# Patient Record
Sex: MALE | Race: WHITE
[De-identification: names, ages, dates, MRNs, and addresses within clinical notes are randomized per-mention and may not be internally consistent; named-entity substitution may affect disease eponyms.]

---

## 2022-07-01 LAB
AVERAGE GLUCOSE: NORMAL
HBA1C MFR BLD: 6.4 %

## 2022-09-30 ENCOUNTER — OFFICE VISIT (OUTPATIENT)
Dept: FAMILY MEDICINE CLINIC | Facility: CLINIC | Age: 72
End: 2022-09-30
Payer: MEDICARE

## 2022-09-30 VITALS
DIASTOLIC BLOOD PRESSURE: 84 MMHG | HEART RATE: 77 BPM | WEIGHT: 221 LBS | BODY MASS INDEX: 29.93 KG/M2 | HEIGHT: 72 IN | SYSTOLIC BLOOD PRESSURE: 118 MMHG | OXYGEN SATURATION: 98 %

## 2022-09-30 DIAGNOSIS — I50.32 CHRONIC DIASTOLIC CONGESTIVE HEART FAILURE (HCC): ICD-10-CM

## 2022-09-30 DIAGNOSIS — Z12.11 SCREEN FOR COLON CANCER: ICD-10-CM

## 2022-09-30 DIAGNOSIS — E78.00 HYPERCHOLESTEREMIA: ICD-10-CM

## 2022-09-30 DIAGNOSIS — N18.4 CKD (CHRONIC KIDNEY DISEASE) STAGE 4, GFR 15-29 ML/MIN (HCC): Primary | ICD-10-CM

## 2022-09-30 DIAGNOSIS — I15.1 HYPERTENSION SECONDARY TO OTHER RENAL DISORDERS: ICD-10-CM

## 2022-09-30 DIAGNOSIS — N28.89 HYPERTENSION SECONDARY TO OTHER RENAL DISORDERS: ICD-10-CM

## 2022-09-30 PROBLEM — R53.83 FATIGUE: Status: ACTIVE | Noted: 2022-01-26

## 2022-09-30 PROBLEM — E11.9 CONTROLLED TYPE 2 DIABETES MELLITUS, WITHOUT LONG-TERM CURRENT USE OF INSULIN (HCC): Status: ACTIVE | Noted: 2017-01-26

## 2022-09-30 PROBLEM — E66.01 CLASS 2 SEVERE OBESITY DUE TO EXCESS CALORIES WITH SERIOUS COMORBIDITY AND BODY MASS INDEX (BMI) OF 37.0 TO 37.9 IN ADULT (HCC): Status: ACTIVE | Noted: 2017-08-29

## 2022-09-30 PROBLEM — N17.9 AKI (ACUTE KIDNEY INJURY) (HCC): Status: ACTIVE | Noted: 2022-02-02

## 2022-09-30 PROBLEM — M54.50 LOWER BACK PAIN: Status: ACTIVE | Noted: 2019-09-12

## 2022-09-30 PROBLEM — J81.0 ACUTE PULMONARY EDEMA (HCC): Status: ACTIVE | Noted: 2022-01-27

## 2022-09-30 PROBLEM — R60.9 PERIPHERAL EDEMA: Status: ACTIVE | Noted: 2022-01-21

## 2022-09-30 PROBLEM — I25.10 CORONARY ARTERY DISEASE INVOLVING NATIVE CORONARY ARTERY OF NATIVE HEART WITHOUT ANGINA PECTORIS: Status: ACTIVE | Noted: 2018-11-02

## 2022-09-30 PROBLEM — E87.5 HYPERKALEMIA: Status: ACTIVE | Noted: 2022-08-09

## 2022-09-30 PROBLEM — D69.6 THROMBOCYTOPENIA (HCC): Status: ACTIVE | Noted: 2022-02-03

## 2022-09-30 PROBLEM — R60.1 GENERALIZED EDEMA: Status: ACTIVE | Noted: 2019-09-12

## 2022-09-30 PROBLEM — Z00.00 MEDICARE ANNUAL WELLNESS VISIT, SUBSEQUENT: Status: ACTIVE | Noted: 2021-04-19

## 2022-09-30 PROCEDURE — 99203 OFFICE O/P NEW LOW 30 MIN: CPT | Performed by: FAMILY MEDICINE

## 2022-09-30 PROCEDURE — 1123F ACP DISCUSS/DSCN MKR DOCD: CPT | Performed by: FAMILY MEDICINE

## 2022-09-30 RX ORDER — ACETAMINOPHEN 500 MG
1 TABLET ORAL DAILY PRN
COMMUNITY

## 2022-09-30 RX ORDER — SPIRONOLACTONE 25 MG/1
25 TABLET ORAL DAILY
COMMUNITY
Start: 2022-04-19 | End: 2023-04-19

## 2022-09-30 RX ORDER — ROSUVASTATIN CALCIUM 40 MG/1
40 TABLET, COATED ORAL DAILY
Qty: 90 TABLET | Refills: 1 | Status: SHIPPED | OUTPATIENT
Start: 2022-09-30 | End: 2023-03-29

## 2022-09-30 RX ORDER — TORSEMIDE 100 MG/1
150 TABLET ORAL DAILY
COMMUNITY
Start: 2022-02-23

## 2022-09-30 RX ORDER — ROSUVASTATIN CALCIUM 40 MG/1
40 TABLET, COATED ORAL DAILY
COMMUNITY
Start: 2022-02-23 | End: 2022-09-30 | Stop reason: SDUPTHER

## 2022-09-30 RX ORDER — BLOOD SUGAR DIAGNOSTIC
STRIP MISCELLANEOUS
COMMUNITY
Start: 2022-09-06

## 2022-09-30 SDOH — ECONOMIC STABILITY: FOOD INSECURITY: WITHIN THE PAST 12 MONTHS, YOU WORRIED THAT YOUR FOOD WOULD RUN OUT BEFORE YOU GOT MONEY TO BUY MORE.: NEVER TRUE

## 2022-09-30 SDOH — ECONOMIC STABILITY: FOOD INSECURITY: WITHIN THE PAST 12 MONTHS, THE FOOD YOU BOUGHT JUST DIDN'T LAST AND YOU DIDN'T HAVE MONEY TO GET MORE.: NEVER TRUE

## 2022-09-30 ASSESSMENT — PATIENT HEALTH QUESTIONNAIRE - PHQ9
SUM OF ALL RESPONSES TO PHQ9 QUESTIONS 1 & 2: 0
SUM OF ALL RESPONSES TO PHQ QUESTIONS 1-9: 0
SUM OF ALL RESPONSES TO PHQ QUESTIONS 1-9: 0
2. FEELING DOWN, DEPRESSED OR HOPELESS: 0
SUM OF ALL RESPONSES TO PHQ QUESTIONS 1-9: 0
1. LITTLE INTEREST OR PLEASURE IN DOING THINGS: 0
SUM OF ALL RESPONSES TO PHQ QUESTIONS 1-9: 0

## 2022-09-30 ASSESSMENT — SOCIAL DETERMINANTS OF HEALTH (SDOH): HOW HARD IS IT FOR YOU TO PAY FOR THE VERY BASICS LIKE FOOD, HOUSING, MEDICAL CARE, AND HEATING?: NOT HARD AT ALL

## 2022-09-30 NOTE — PROGRESS NOTES
PROGRESS NOTE    SUBJECTIVE:   Rachel oHyos is a 74 y.o. male seen for a follow up visit regarding   Chief Complaint   Patient presents with    New Patient     Establish care. Scar     Reports glass cut him above his left eyebrow x 4-5 months ago. Denies any pain. Would like to have it checked. HPI:  Patient comes in today wanting to establish care with us. He is doing well today. He would like me to check on a previous laceration above his left eyebrow. Patient's chart is reviewed, chronic problems reviewed, medications reviewed. He sees nephrology regularly, has CKD 4. Sees cardiology, has a history of CHF, recent echo showed normal systolic function, diastolic function could not be assessed however is suspected. Has severe pulmonary hypertension with dilated IVC. Patient is up and about the home on a regular basis. He does not get an extended walk in. Reviewed and updated this visit by provider:  Tobacco  Allergies  Meds  Problems  Med Hx  Surg Hx  Fam Hx           Review of Systems   General: Feels well, decreased energy for age, denies fever, weights daily, denies significant change in weight, good appetite  ENT: No nasal or sinus symptoms.   No throat symptoms  Eyes: No visual disturbances, no eye drainage or redness  Pulmonary: No cough or wheezing  CVS: No chest pain, palpitations, claudication  GI: No nausea, vomiting, diarrhea, constipation  : No dysuria or hematuria    OBJECTIVE:  Vitals:    09/30/22 0850   BP: 118/84   Site: Left Upper Arm   Cuff Size: Medium Adult   Pulse: 77   SpO2: 98%   Weight: 221 lb (100.2 kg)   Height: 6' (1.829 m)        Physical Exam   General: Alert and oriented x3, well-appearing  HEENT: Normocephalic; external ears, ear canals, and  Tympanic membranes normal; PERRLA, EOMI, normal conjunctiva; normal nasal mucosa without drainage; oropharynx is moist without mucosal lesion  Neck: No adenopathy, thyromegaly or thyroid nodules  Pulmonary: Normal effort, good airflow, no rales or rhonchi  CVS: Regular rate and rhythm, normal S1, S2, no S3 or S4, no murmurs; no carotid bruits, 2+ pedal pulses  Abdomen: Nondistended, normal bowel sounds, nontender without mass organomegaly  Extremities: 2+ dependent edema     medical problems and test results were reviewed with the patient today. No results found for this or any previous visit (from the past 672 hour(s)). No results found for any visits on 09/30/22. ASSESSMENT and PLAN    1. CKD (chronic kidney disease) stage 4, GFR 15-29 ml/min [facility]), sees nephrology regularly. Discussed renal precautions which he knows well. Discussed self titration of diuretic based on daily weights. -     rosuvastatin (CRESTOR) 40 MG tablet; Take 1 tablet by mouth daily, Disp-90 tablet, R-1Normal  -     CBC with Auto Differential; Future  -     Basic Metabolic Panel; Future  -     Hepatic Function Panel; Future  2. Hypertension secondary to other renal disorders, discussed TLC  3. Chronic diastolic congestive heart failure ([address]  4. Hypercholesteremia  -     Lipid Panel; Future    -Discussed low-fat diet  Discussed health maintenance issues, patient reports that he had a normal colonoscopy couple of years ago, chart review reveals it was 2012. We will order Cologuard    Return in about 3 months (around 12/30/2022) for f/u with labs prior.        Brianna Felix MD

## 2022-10-30 PROBLEM — Z00.00 MEDICARE ANNUAL WELLNESS VISIT, SUBSEQUENT: Status: RESOLVED | Noted: 2021-04-19 | Resolved: 2022-10-30
